# Patient Record
Sex: FEMALE | Race: WHITE | URBAN - NONMETROPOLITAN AREA
[De-identification: names, ages, dates, MRNs, and addresses within clinical notes are randomized per-mention and may not be internally consistent; named-entity substitution may affect disease eponyms.]

---

## 2017-02-24 ENCOUNTER — OFFICE VISIT (OUTPATIENT)
Dept: PRIMARY CARE CLINIC | Age: 42
End: 2017-02-24
Payer: MEDICAID

## 2017-02-24 VITALS
WEIGHT: 184 LBS | OXYGEN SATURATION: 96 % | TEMPERATURE: 97 F | HEIGHT: 63 IN | BODY MASS INDEX: 32.6 KG/M2 | HEART RATE: 88 BPM | SYSTOLIC BLOOD PRESSURE: 110 MMHG | RESPIRATION RATE: 20 BRPM | DIASTOLIC BLOOD PRESSURE: 78 MMHG

## 2017-02-24 DIAGNOSIS — Z11.4 SCREENING FOR HIV WITHOUT PRESENCE OF RISK FACTORS: ICD-10-CM

## 2017-02-24 DIAGNOSIS — G47.33 OSA (OBSTRUCTIVE SLEEP APNEA): ICD-10-CM

## 2017-02-24 DIAGNOSIS — G89.29 CHRONIC LEFT-SIDED LOW BACK PAIN WITH RIGHT-SIDED SCIATICA: ICD-10-CM

## 2017-02-24 DIAGNOSIS — R53.82 CHRONIC FATIGUE: ICD-10-CM

## 2017-02-24 DIAGNOSIS — E66.09 NON MORBID OBESITY DUE TO EXCESS CALORIES: ICD-10-CM

## 2017-02-24 DIAGNOSIS — Z00.00 ROUTINE GENERAL MEDICAL EXAMINATION AT A HEALTH CARE FACILITY: Primary | ICD-10-CM

## 2017-02-24 DIAGNOSIS — R91.8 ABNORMAL CT SCAN, LUNG: ICD-10-CM

## 2017-02-24 DIAGNOSIS — Z13.1 ENCOUNTER FOR SCREENING FOR DIABETES MELLITUS: ICD-10-CM

## 2017-02-24 DIAGNOSIS — F31.9 BIPOLAR DEPRESSION (HCC): ICD-10-CM

## 2017-02-24 DIAGNOSIS — I10 BENIGN ESSENTIAL HTN: ICD-10-CM

## 2017-02-24 DIAGNOSIS — Z23 NEED FOR STREPTOCOCCUS PNEUMONIAE VACCINATION: ICD-10-CM

## 2017-02-24 DIAGNOSIS — M54.41 CHRONIC LEFT-SIDED LOW BACK PAIN WITH RIGHT-SIDED SCIATICA: ICD-10-CM

## 2017-02-24 DIAGNOSIS — Z80.0 FAMILY HISTORY OF COLON CANCER: ICD-10-CM

## 2017-02-24 DIAGNOSIS — J43.2 CENTRILOBULAR EMPHYSEMA (HCC): ICD-10-CM

## 2017-02-24 PROBLEM — J41.0 SIMPLE CHRONIC BRONCHITIS (HCC): Status: ACTIVE | Noted: 2017-02-24

## 2017-02-24 PROBLEM — J41.0 SIMPLE CHRONIC BRONCHITIS (HCC): Status: RESOLVED | Noted: 2017-02-24 | Resolved: 2017-02-24

## 2017-02-24 PROCEDURE — 99386 PREV VISIT NEW AGE 40-64: CPT | Performed by: FAMILY MEDICINE

## 2017-02-24 PROCEDURE — 90471 IMMUNIZATION ADMIN: CPT | Performed by: FAMILY MEDICINE

## 2017-02-24 PROCEDURE — 90670 PCV13 VACCINE IM: CPT | Performed by: FAMILY MEDICINE

## 2017-02-24 PROCEDURE — 99204 OFFICE O/P NEW MOD 45 MIN: CPT | Performed by: FAMILY MEDICINE

## 2017-02-24 RX ORDER — HYDROXYZINE HYDROCHLORIDE 25 MG/1
25 TABLET, FILM COATED ORAL 3 TIMES DAILY
Qty: 90 TABLET | Refills: 0 | Status: SHIPPED | OUTPATIENT
Start: 2017-02-24 | End: 2017-03-21 | Stop reason: SDUPTHER

## 2017-02-24 RX ORDER — QUETIAPINE FUMARATE 100 MG/1
100 TABLET, FILM COATED ORAL DAILY
COMMUNITY
Start: 2017-02-09

## 2017-02-24 RX ORDER — OMEPRAZOLE 20 MG/1
20 CAPSULE, DELAYED RELEASE ORAL DAILY
COMMUNITY
Start: 2017-01-23

## 2017-02-24 RX ORDER — ATENOLOL 50 MG/1
50 TABLET ORAL DAILY
COMMUNITY
Start: 2017-01-30

## 2017-02-24 RX ORDER — INDOMETHACIN 50 MG/1
50 CAPSULE ORAL 3 TIMES DAILY
Qty: 90 CAPSULE | Refills: 0 | Status: SHIPPED | OUTPATIENT
Start: 2017-02-24 | End: 2017-03-21 | Stop reason: SDUPTHER

## 2017-02-24 RX ORDER — ARIPIPRAZOLE 2 MG/1
2 TABLET ORAL DAILY
COMMUNITY
Start: 2017-01-23

## 2017-02-24 RX ORDER — HYDROXYZINE HYDROCHLORIDE 25 MG/1
TABLET, FILM COATED ORAL
COMMUNITY
Start: 2017-01-31 | End: 2017-02-24 | Stop reason: SDUPTHER

## 2017-02-24 RX ORDER — ALLANTOIN/ONION/PEG/WATER
GEL (GRAM) TOPICAL
Qty: 50 G | Refills: 0 | Status: SHIPPED | OUTPATIENT
Start: 2017-02-24 | End: 2017-03-15 | Stop reason: CLARIF

## 2017-02-24 RX ORDER — BUSPIRONE HYDROCHLORIDE 15 MG/1
15 TABLET ORAL 3 TIMES DAILY
COMMUNITY
Start: 2017-01-30

## 2017-02-24 RX ORDER — FLUTICASONE PROPIONATE 50 MCG
2 SPRAY, SUSPENSION (ML) NASAL DAILY
COMMUNITY
Start: 2017-01-17

## 2017-02-24 RX ORDER — SERTRALINE HYDROCHLORIDE 100 MG/1
200 TABLET, FILM COATED ORAL DAILY
COMMUNITY
Start: 2017-01-23

## 2017-02-24 RX ORDER — CYCLOBENZAPRINE HCL 10 MG
10 TABLET ORAL 3 TIMES DAILY PRN
COMMUNITY
Start: 2017-01-17

## 2017-02-24 RX ORDER — GABAPENTIN 800 MG/1
TABLET ORAL
COMMUNITY
Start: 2017-02-09 | End: 2017-02-24 | Stop reason: SDUPTHER

## 2017-02-24 RX ORDER — GABAPENTIN 800 MG/1
800 TABLET ORAL 3 TIMES DAILY
Qty: 90 TABLET | Refills: 0 | Status: SHIPPED | OUTPATIENT
Start: 2017-02-24 | End: 2017-05-05 | Stop reason: SDUPTHER

## 2017-02-24 RX ORDER — VARENICLINE TARTRATE 1 MG/1
TABLET, FILM COATED ORAL
COMMUNITY
Start: 2016-12-28 | End: 2017-03-15 | Stop reason: CLARIF

## 2017-02-24 RX ORDER — CETIRIZINE HYDROCHLORIDE, PSEUDOEPHEDRINE HYDROCHLORIDE 5; 120 MG/1; MG/1
1 TABLET, FILM COATED, EXTENDED RELEASE ORAL 2 TIMES DAILY
COMMUNITY
Start: 2017-01-30

## 2017-02-24 ASSESSMENT — ENCOUNTER SYMPTOMS
SORE THROAT: 0
NAUSEA: 0
WHEEZING: 0
PHOTOPHOBIA: 0
ABDOMINAL PAIN: 0
RHINORRHEA: 0
COUGH: 0
COLOR CHANGE: 0
VOMITING: 0
SHORTNESS OF BREATH: 0
EYE PAIN: 0
TROUBLE SWALLOWING: 0
CHEST TIGHTNESS: 0
BACK PAIN: 1
DIARRHEA: 0
CONSTIPATION: 0

## 2017-03-09 ENCOUNTER — OFFICE VISIT (OUTPATIENT)
Dept: RETAIL CLINIC | Facility: CLINIC | Age: 42
End: 2017-03-09

## 2017-03-09 VITALS
HEART RATE: 90 BPM | OXYGEN SATURATION: 96 % | SYSTOLIC BLOOD PRESSURE: 112 MMHG | TEMPERATURE: 98.2 F | DIASTOLIC BLOOD PRESSURE: 92 MMHG

## 2017-03-09 DIAGNOSIS — T14.8XXA BLISTER: Primary | ICD-10-CM

## 2017-03-09 DIAGNOSIS — L03.90 CELLULITIS, UNSPECIFIED CELLULITIS SITE: ICD-10-CM

## 2017-03-09 PROCEDURE — 99202 OFFICE O/P NEW SF 15 MIN: CPT | Performed by: NURSE PRACTITIONER

## 2017-03-09 RX ORDER — ARIPIPRAZOLE 2 MG/1
2 TABLET ORAL DAILY
COMMUNITY

## 2017-03-09 RX ORDER — OMEPRAZOLE 20 MG/1
20 CAPSULE, DELAYED RELEASE ORAL DAILY
COMMUNITY

## 2017-03-09 RX ORDER — ALBUTEROL SULFATE 90 UG/1
2 AEROSOL, METERED RESPIRATORY (INHALATION) EVERY 4 HOURS PRN
COMMUNITY

## 2017-03-09 RX ORDER — FLUTICASONE PROPIONATE 50 MCG
2 SPRAY, SUSPENSION (ML) NASAL DAILY
COMMUNITY

## 2017-03-09 RX ORDER — GUAIFENESIN 400 MG/1
400 TABLET ORAL
COMMUNITY

## 2017-03-09 RX ORDER — GABAPENTIN 800 MG/1
800 TABLET ORAL 3 TIMES DAILY
COMMUNITY

## 2017-03-09 RX ORDER — SULFAMETHOXAZOLE AND TRIMETHOPRIM 800; 160 MG/1; MG/1
1 TABLET ORAL 2 TIMES DAILY
Qty: 14 TABLET | Refills: 0 | Status: SHIPPED | OUTPATIENT
Start: 2017-03-09 | End: 2017-03-16

## 2017-03-09 RX ORDER — CYCLOBENZAPRINE HCL 10 MG
10 TABLET ORAL 3 TIMES DAILY PRN
COMMUNITY

## 2017-03-09 RX ORDER — QUETIAPINE FUMARATE 100 MG/1
100 TABLET, FILM COATED ORAL NIGHTLY
COMMUNITY

## 2017-03-09 RX ORDER — INDOMETHACIN 50 MG/1
50 CAPSULE ORAL 3 TIMES DAILY PRN
COMMUNITY

## 2017-03-09 RX ORDER — HYDROXYZINE HYDROCHLORIDE 25 MG/1
25 TABLET, FILM COATED ORAL 3 TIMES DAILY PRN
COMMUNITY

## 2017-03-09 RX ORDER — SERTRALINE HYDROCHLORIDE 100 MG/1
200 TABLET, FILM COATED ORAL DAILY
COMMUNITY

## 2017-03-09 RX ORDER — CETIRIZINE HYDROCHLORIDE, PSEUDOEPHEDRINE HYDROCHLORIDE 5; 120 MG/1; MG/1
1 TABLET, FILM COATED, EXTENDED RELEASE ORAL 2 TIMES DAILY
COMMUNITY

## 2017-03-09 RX ORDER — ATENOLOL 50 MG/1
50 TABLET ORAL DAILY
COMMUNITY

## 2017-03-09 NOTE — PATIENT INSTRUCTIONS
Stop cleansing with Hydrogen Peroxide  Cleanse twice daily with warm water and soap; apply triple antibiotic ointment in mornings with bandaid.  Leave open to air at night  Monitor for worsening symptoms (streaking, increased/darker redness, drainage, odor, pain).  Follow up with Urgent Care if these symptoms occur.      Blisters  A blister is a fluid-filled sac that forms between layers of skin. Blisters often form in areas where skin rubs against other skin or rubs against something else. The most common areas for blisters are the hands and feet.  CAUSES  A blister can be caused by:  · An injury.  · A burn.  · An allergic reaction.  · An infection.  · Exposure to irritating chemicals.  · Friction.  Friction blisters often result from:  · Sports.  · Repetitive activities.  · Shoes that are too tight or too loose.  SIGNS AND SYMPTOMS  A blister is often round and looks like a bump. It may itch or be painful to the touch. The liquid in a blister is clear or bloody. Before a blister forms, the skin may become red, feel warm, itch, or be painful to the touch.  DIAGNOSIS  A blister can usually be diagnosed from its appearance.  TREATMENT  Treatment involves protecting the area where the blister has formed until the skin has healed. If something is likely to rub against the blister, apply a bandage (dressing) with a hole in the middle over the blister.  Most blisters break open, dry up, and go away on their own within 10 days. Rarely, blisters that are very painful may be drained before they break open on their own. Draining of a blister should only be done by a health care provider under sterile conditions.  HOME CARE INSTRUCTIONS  · Protect the area where the blister has formed as directed by your health care provider.  · Do not open or pop your blister, because it could become infected.  · If the blister is very painful, ask your health care provider whether you should have it drained.  · If the blister breaks open on  its own:    Do not remove the loose skin that is over the blister.    Wash the blister area with soap and water every day.    After washing the blister area, you may apply an antibiotic cream or ointment and cover the area with a bandage.  PREVENTION  Taking these steps can help to prevent blisters that are caused by friction:  · Wear comfortable shoes that fit well.  · Always wear socks with shoes.  · Wear extra socks or use tape, bandages, or pads over blister-prone areas as needed.  · Wear protective gear, such as gloves, when participating in sports or activities that can cause blisters.  · Use powders as needed to keep your feet dry.  SEEK MEDICAL CARE IF:  · You have increased redness, swelling, or pain in the blister area.  · A puslike discharge is coming from the blister area.  · You have a fever.  · You have chills.     This information is not intended to replace advice given to you by your health care provider. Make sure you discuss any questions you have with your health care provider.     Document Released: 01/25/2006 Document Revised: 01/08/2016 Document Reviewed: 07/18/2015  [employer] Patient Education ©2016 [employer]

## 2017-03-09 NOTE — PROGRESS NOTES
Subjective   Shellie Sher is a 41 y.o. female.     Blister   This is a new problem. The current episode started 1 to 4 weeks ago (Shoes rubbed blister on ankle). The problem has been gradually worsening. Associated symptoms include nausea (Every once in a while). Pertinent negatives include no chest pain, fever or vomiting. The symptoms are aggravated by walking (Rubbing on shoe). Treatments tried: Peroxide (5-6x) The treatment provided no relief.    Patient currently resides in shelter.  States she has to be out of shelter between 9am-4pm and walks a lot during this time.      The following portions of the patient's history were reviewed and updated as appropriate: allergies, current medications, past family history, past medical history, past social history, past surgical history and problem list.    Review of Systems   Constitutional: Negative for fever.   Respiratory: Negative for shortness of breath.    Cardiovascular: Positive for leg swelling. Negative for chest pain and palpitations.   Gastrointestinal: Positive for nausea (Every once in a while). Negative for diarrhea and vomiting.   Skin: Positive for wound.   Hematological: Does not bruise/bleed easily.       Objective   Physical Exam   Constitutional: She appears well-developed and well-nourished. No distress.   HENT:   Right Ear: External ear normal. Tympanic membrane is not erythematous and not bulging.   Left Ear: External ear normal. Tympanic membrane is not erythematous and not bulging.   Mouth/Throat: Posterior oropharyngeal erythema (PND Noted) present. No oropharyngeal exudate.   Cardiovascular: Normal rate, regular rhythm and normal heart sounds.  Exam reveals no gallop and no friction rub.    No murmur heard.  Pulmonary/Chest: Effort normal and breath sounds normal. No respiratory distress. She has no wheezes. She has no rales. She exhibits no tenderness.   Neurological: She is alert.   Skin: Skin is warm. She is not diaphoretic.         Psychiatric: She has a normal mood and affect. Her behavior is normal.       Assessment/Plan   Shellie was seen today for blister.    Diagnoses and all orders for this visit:    Blister  -     sulfamethoxazole-trimethoprim (BACTRIM DS,SEPTRA DS) 800-160 MG per tablet; Take 1 tablet by mouth 2 (Two) Times a Day for 7 days.    Cellulitis, unspecified cellulitis site  -     sulfamethoxazole-trimethoprim (BACTRIM DS,SEPTRA DS) 800-160 MG per tablet; Take 1 tablet by mouth 2 (Two) Times a Day for 7 days.      Stop cleansing with Hydrogen Peroxide  Cleanse twice daily with warm water and soap; apply triple antibiotic ointment in mornings with bandaid.  Leave open to air at night  Monitor for worsening symptoms (streaking, increased/darker redness, drainage, odor, pain).  Follow up with Urgent Care if these symptoms occur.

## 2017-03-10 ENCOUNTER — TELEPHONE (OUTPATIENT)
Dept: NEUROLOGY | Age: 42
End: 2017-03-10

## 2017-03-15 ENCOUNTER — TELEPHONE (OUTPATIENT)
Dept: PRIMARY CARE CLINIC | Age: 42
End: 2017-03-15

## 2017-03-15 ENCOUNTER — OFFICE VISIT (OUTPATIENT)
Dept: GASTROENTEROLOGY | Age: 42
End: 2017-03-15
Payer: MEDICAID

## 2017-03-15 VITALS
BODY MASS INDEX: 33.17 KG/M2 | WEIGHT: 187.2 LBS | HEIGHT: 63 IN | SYSTOLIC BLOOD PRESSURE: 102 MMHG | OXYGEN SATURATION: 94 % | HEART RATE: 83 BPM | DIASTOLIC BLOOD PRESSURE: 80 MMHG

## 2017-03-15 DIAGNOSIS — I10 BENIGN ESSENTIAL HTN: ICD-10-CM

## 2017-03-15 DIAGNOSIS — Z80.0 FAMILY HISTORY OF COLON CANCER: Primary | ICD-10-CM

## 2017-03-15 DIAGNOSIS — K59.09 CHRONIC CONSTIPATION: ICD-10-CM

## 2017-03-15 DIAGNOSIS — R53.82 CHRONIC FATIGUE: ICD-10-CM

## 2017-03-15 LAB
ALBUMIN SERPL-MCNC: 4.3 G/DL (ref 3.5–5.2)
ALP BLD-CCNC: 220 U/L (ref 35–104)
ALT SERPL-CCNC: 117 U/L (ref 5–33)
ANION GAP SERPL CALCULATED.3IONS-SCNC: 13 MMOL/L (ref 7–19)
AST SERPL-CCNC: 131 U/L (ref 5–32)
BASOPHILS ABSOLUTE: 0 K/UL (ref 0–0.2)
BASOPHILS RELATIVE PERCENT: 0.5 % (ref 0–1)
BILIRUB SERPL-MCNC: <0.2 MG/DL (ref 0.2–1.2)
BUN BLDV-MCNC: 15 MG/DL (ref 6–20)
CALCIUM SERPL-MCNC: 9.2 MG/DL (ref 8.6–10)
CHLORIDE BLD-SCNC: 102 MMOL/L (ref 98–111)
CHOLESTEROL, TOTAL: 272 MG/DL (ref 160–199)
CO2: 24 MMOL/L (ref 22–29)
CREAT SERPL-MCNC: 0.7 MG/DL (ref 0.5–0.9)
EOSINOPHILS ABSOLUTE: 0.3 K/UL (ref 0–0.6)
EOSINOPHILS RELATIVE PERCENT: 4.3 % (ref 0–5)
FOLATE: 12.9 NG/ML (ref 4.8–37.3)
GFR NON-AFRICAN AMERICAN: >60
GLOBULIN: 2.9 G/DL
GLUCOSE BLD-MCNC: 92 MG/DL (ref 74–109)
HCT VFR BLD CALC: 43.1 % (ref 37–47)
HDLC SERPL-MCNC: 67 MG/DL (ref 65–121)
HEMOGLOBIN: 14.4 G/DL (ref 12–16)
LDL CHOLESTEROL CALCULATED: 153 MG/DL
LYMPHOCYTES ABSOLUTE: 1.1 K/UL (ref 1.1–4.5)
LYMPHOCYTES RELATIVE PERCENT: 14.1 % (ref 20–40)
MCH RBC QN AUTO: 28.7 PG (ref 27–31)
MCHC RBC AUTO-ENTMCNC: 33.4 G/DL (ref 33–37)
MCV RBC AUTO: 85.9 FL (ref 81–99)
MONOCYTES ABSOLUTE: 0.5 K/UL (ref 0–0.9)
MONOCYTES RELATIVE PERCENT: 7 % (ref 0–10)
NEUTROPHILS ABSOLUTE: 5.7 K/UL (ref 1.5–7.5)
NEUTROPHILS RELATIVE PERCENT: 74.1 % (ref 50–65)
PDW BLD-RTO: 14.5 % (ref 11.5–14.5)
PLATELET # BLD: 258 K/UL (ref 130–400)
PMV BLD AUTO: 10.7 FL (ref 7.4–10.4)
POTASSIUM SERPL-SCNC: 4.9 MMOL/L (ref 3.5–5)
RBC # BLD: 5.02 M/UL (ref 4.2–5.4)
SODIUM BLD-SCNC: 139 MMOL/L (ref 136–145)
TOTAL PROTEIN: 7.2 G/DL (ref 6.6–8.7)
TRIGL SERPL-MCNC: 262 MG/DL (ref 150–199)
TSH SERPL DL<=0.05 MIU/L-ACNC: 1.6 UIU/ML (ref 0.27–4.2)
VITAMIN B-12: 476 PG/ML (ref 211–946)
WBC # BLD: 7.7 K/UL (ref 4.8–10.8)

## 2017-03-15 PROCEDURE — 99214 OFFICE O/P EST MOD 30 MIN: CPT | Performed by: NURSE PRACTITIONER

## 2017-03-15 RX ORDER — DOCUSATE SODIUM 100 MG/1
100 CAPSULE, LIQUID FILLED ORAL 2 TIMES DAILY
Qty: 60 CAPSULE | Refills: 3 | Status: SHIPPED | OUTPATIENT
Start: 2017-03-15

## 2017-03-15 ASSESSMENT — ENCOUNTER SYMPTOMS
SORE THROAT: 1
ALLERGIC/IMMUNOLOGIC NEGATIVE: 1
COUGH: 0
ABDOMINAL PAIN: 0
BLOOD IN STOOL: 0
CONSTIPATION: 1
BACK PAIN: 0
VOMITING: 0
RECTAL PAIN: 0
CHEST TIGHTNESS: 0
VOICE CHANGE: 1
SHORTNESS OF BREATH: 1
EYES NEGATIVE: 1
DIARRHEA: 0
NAUSEA: 0

## 2017-03-17 DIAGNOSIS — E78.2 MIXED HYPERLIPIDEMIA: Primary | ICD-10-CM

## 2017-03-17 DIAGNOSIS — R74.8 INCREASED LIVER ENZYMES: ICD-10-CM

## 2017-03-17 RX ORDER — ATORVASTATIN CALCIUM 20 MG/1
20 TABLET, FILM COATED ORAL DAILY
Qty: 30 TABLET | Refills: 3 | Status: SHIPPED | OUTPATIENT
Start: 2017-03-17

## 2017-03-20 ENCOUNTER — TELEPHONE (OUTPATIENT)
Dept: PRIMARY CARE CLINIC | Age: 42
End: 2017-03-20

## 2017-03-21 DIAGNOSIS — R74.8 INCREASED LIVER ENZYMES: ICD-10-CM

## 2017-03-22 LAB
HAV IGM SER IA-ACNC: NORMAL
HEPATITIS B CORE IGM ANTIBODY: NORMAL
HEPATITIS B SURFACE ANTIGEN INTERPRETATION: NORMAL
HEPATITIS C ANTIBODY INTERPRETATION: NORMAL

## 2017-03-24 ENCOUNTER — OFFICE VISIT (OUTPATIENT)
Dept: NEUROLOGY | Age: 42
End: 2017-03-24
Payer: MEDICAID

## 2017-03-24 VITALS
HEIGHT: 63 IN | SYSTOLIC BLOOD PRESSURE: 114 MMHG | BODY MASS INDEX: 32.78 KG/M2 | RESPIRATION RATE: 16 BRPM | DIASTOLIC BLOOD PRESSURE: 85 MMHG | WEIGHT: 185 LBS | HEART RATE: 86 BPM

## 2017-03-24 DIAGNOSIS — R40.0 SOMNOLENCE, DAYTIME: ICD-10-CM

## 2017-03-24 DIAGNOSIS — G47.00 INSOMNIA, UNSPECIFIED TYPE: ICD-10-CM

## 2017-03-24 DIAGNOSIS — G47.33 OBSTRUCTIVE SLEEP APNEA (ADULT) (PEDIATRIC): Primary | ICD-10-CM

## 2017-03-24 DIAGNOSIS — R06.83 SNORING: ICD-10-CM

## 2017-03-24 PROCEDURE — 99214 OFFICE O/P EST MOD 30 MIN: CPT | Performed by: PHYSICIAN ASSISTANT

## 2017-03-27 ENCOUNTER — APPOINTMENT (OUTPATIENT)
Dept: GENERAL RADIOLOGY | Facility: HOSPITAL | Age: 42
End: 2017-03-27

## 2017-03-27 ENCOUNTER — HOSPITAL ENCOUNTER (EMERGENCY)
Facility: HOSPITAL | Age: 42
Discharge: HOME | End: 2017-03-27
Admitting: EMERGENCY MEDICINE

## 2017-03-27 VITALS
WEIGHT: 185 LBS | HEIGHT: 63 IN | TEMPERATURE: 97.8 F | HEART RATE: 96 BPM | RESPIRATION RATE: 18 BRPM | BODY MASS INDEX: 32.78 KG/M2 | OXYGEN SATURATION: 96 % | DIASTOLIC BLOOD PRESSURE: 91 MMHG | SYSTOLIC BLOOD PRESSURE: 128 MMHG

## 2017-03-27 DIAGNOSIS — R09.1 PLEURISY: ICD-10-CM

## 2017-03-27 DIAGNOSIS — J40 BRONCHITIS: Primary | ICD-10-CM

## 2017-03-27 PROCEDURE — 99282 EMERGENCY DEPT VISIT SF MDM: CPT

## 2017-03-27 PROCEDURE — 71020 HC CHEST PA AND LATERAL: CPT

## 2017-03-27 RX ORDER — METHYLPREDNISOLONE 4 MG/1
TABLET ORAL
Qty: 21 TABLET | Refills: 0 | Status: SHIPPED | OUTPATIENT
Start: 2017-03-27

## 2017-03-27 RX ORDER — ALBUTEROL SULFATE 90 UG/1
2 AEROSOL, METERED RESPIRATORY (INHALATION) EVERY 4 HOURS PRN
Qty: 18 G | Refills: 0 | Status: SHIPPED | OUTPATIENT
Start: 2017-03-27

## 2017-03-27 RX ORDER — AZITHROMYCIN 250 MG/1
TABLET, FILM COATED ORAL
Qty: 6 TABLET | Refills: 0 | Status: SHIPPED | OUTPATIENT
Start: 2017-03-27

## 2017-03-27 NOTE — DISCHARGE INSTRUCTIONS
Return to ER if symptoms worsen   Moist heat compresses to back three times a day for 15-20 min intervals

## 2017-03-27 NOTE — ED PROVIDER NOTES
Subjective   HPI Comments: Patient is a 41-year-old white female presents with about a 6 hour history of right midthoracic back pain.  She states she is also had a cough for the past 2-3 days.  She states she has increased pain with deep inspiration or cough.  She denies any fever or chills.  She denies any chest pain or shortness of breath.    Patient is a 41 y.o. female presenting with cough.   History provided by:  Patient   used: No    Cough       Review of Systems   Constitutional: Negative.    HENT: Negative.    Eyes: Negative.    Respiratory: Positive for cough.         Pt presents with about 6 hour hx of right midthoracic back pain. She states that she has hx of 3 days of nonprod cough. She states that she has increased pain with deep inspiration and cough. She denies any chest pain or sob. She denies any fever or chills    Cardiovascular: Negative.    Gastrointestinal: Negative.    Endocrine: Negative.    Genitourinary: Negative.    Musculoskeletal: Negative.    Skin: Negative.    Allergic/Immunologic: Negative.    Neurological: Negative.    Hematological: Negative.    Psychiatric/Behavioral: Negative.    All other systems reviewed and are negative.      Past Medical History:   Diagnosis Date   • Allergic    • Anxiety    • Cancer    • COPD (chronic obstructive pulmonary disease)    • Depression    • Hypertension    • Lung disease    • Pain, chronic    • Urinary tract infection        No Known Allergies    Past Surgical History:   Procedure Laterality Date   • HYSTERECTOMY      Partial   • SPINE SURGERY      T2 fused L2       Family History   Problem Relation Age of Onset   • Diabetes Mother    • Stroke Mother    • Cancer Father    • Diabetes Other        Social History     Social History   • Marital status:      Spouse name: N/A   • Number of children: N/A   • Years of education: N/A     Social History Main Topics   • Smoking status: Former Smoker   • Smokeless tobacco: None   •  Alcohol use No   • Drug use: No   • Sexual activity: Defer     Other Topics Concern   • None     Social History Narrative   • None       Prior to Admission medications    Medication Sig Start Date End Date Taking? Authorizing Provider   albuterol (PROVENTIL HFA;VENTOLIN HFA) 108 (90 BASE) MCG/ACT inhaler Inhale 2 puffs Every 4 (Four) Hours As Needed for Wheezing.    Historical Provider, MD   ARIPiprazole (ABILIFY) 2 MG tablet Take 2 mg by mouth Daily.    Historical Provider, MD   atenolol (TENORMIN) 50 MG tablet Take 50 mg by mouth Daily.    Historical Provider, MD   cetirizine-pseudoephedrine (ZyrTEC-D) 5-120 MG per 12 hr tablet Take 1 tablet by mouth 2 (Two) Times a Day.    Historical Provider, MD   cyclobenzaprine (FLEXERIL) 10 MG tablet Take 10 mg by mouth 3 (Three) Times a Day As Needed for muscle spasms.    Historical Provider, MD   fluticasone (FLONASE) 50 MCG/ACT nasal spray 2 sprays into each nostril Daily.    Historical Provider, MD   gabapentin (NEURONTIN) 800 MG tablet Take 800 mg by mouth 3 (Three) Times a Day.    Historical Provider, MD   guaiFENesin (MUCUS RELIEF) 400 MG tablet Take 400 mg by mouth Every 4 (Four) Hours.    Historical Provider, MD   hydrOXYzine (ATARAX) 25 MG tablet Take 25 mg by mouth 3 (Three) Times a Day As Needed for Itching.    Historical Provider, MD   indomethacin (INDOCIN) 50 MG capsule Take 50 mg by mouth 3 (Three) Times a Day As Needed for Mild Pain (1-3).    Historical Provider, MD   metoprolol tartrate (LOPRESSOR) 25 MG tablet Take 25 mg by mouth 2 (Two) Times a Day.    Historical Provider, MD   omeprazole (priLOSEC) 20 MG capsule Take 20 mg by mouth Daily.    Historical Provider, MD   QUEtiapine (SEROquel) 100 MG tablet Take 100 mg by mouth Every Night.    Historical Provider, MD   sertraline (ZOLOFT) 100 MG tablet Take 200 mg by mouth Daily.    Historical Provider, MD   Tiotropium Bromide-Olodaterol (STIOLTO RESPIMAT) 2.5-2.5 MCG/ACT aerosol solution Inhale 2 inhalers.     "Historical Provider, MD       /91 (BP Location: Left arm, Patient Position: Standing)  Pulse 96  Temp 97.8 °F (36.6 °C) (Oral)   Resp 18  Ht 63\" (160 cm)  Wt 185 lb (83.9 kg)  SpO2 96%  BMI 32.77 kg/m2    Objective   Physical Exam   Constitutional: She is oriented to person, place, and time. She appears well-developed and well-nourished.   HENT:   Head: Normocephalic and atraumatic.   Eyes: Conjunctivae and EOM are normal. Pupils are equal, round, and reactive to light.   Neck: Normal range of motion. Neck supple. No tracheal deviation present. No thyromegaly present.   Cardiovascular: Normal rate, regular rhythm, normal heart sounds and intact distal pulses.    Pulmonary/Chest: Effort normal and breath sounds normal. No respiratory distress. She has no wheezes. She has no rales. She exhibits no tenderness.   Pt states that she has increased pain to right mid thoracic area with cough and deep inspiration.    Abdominal: Soft. Bowel sounds are normal.   Musculoskeletal: Normal range of motion.   Neurological: She is alert and oriented to person, place, and time. She has normal reflexes. No cranial nerve deficit.   Skin: Skin is warm and dry.   Psychiatric: She has a normal mood and affect. Her behavior is normal. Judgment and thought content normal.   Nursing note and vitals reviewed.      Procedures         Lab Results (last 24 hours)     ** No results found for the last 24 hours. **          XR Chest 2 View   ED Interpretation   Negative for anything acute- reviewed with dr valerio      Final Result   1. Patchy increased interstitial markings bilaterally more prominent in   the right upper lobe. This likely represents pulmonary fibrosis.   Infectious disease is felt to be less likely.   2. No dense consolidation or effusion.            This report was finalized on 03/27/2017 13:35 by Dr. Toni Garcia MD.          ED Course  ED Course          MDM  Number of Diagnoses or Management Options  Bronchitis: " minor  Pleurisy: minor     Amount and/or Complexity of Data Reviewed  Tests in the radiology section of CPT®: ordered and reviewed  Independent visualization of images, tracings, or specimens: yes    Patient Progress  Patient progress: stable      Final diagnoses:   Bronchitis   Pleurisy          Radha Lai, APRN  03/27/17 [id number]

## 2017-05-05 RX ORDER — GABAPENTIN 800 MG/1
800 TABLET ORAL 3 TIMES DAILY
Qty: 90 TABLET | Refills: 1 | Status: SHIPPED | OUTPATIENT
Start: 2017-05-05

## 2018-05-08 ENCOUNTER — TELEPHONE (OUTPATIENT)
Dept: PRIMARY CARE CLINIC | Age: 43
End: 2018-05-08